# Patient Record
Sex: MALE | Race: WHITE | NOT HISPANIC OR LATINO
[De-identification: names, ages, dates, MRNs, and addresses within clinical notes are randomized per-mention and may not be internally consistent; named-entity substitution may affect disease eponyms.]

---

## 2022-09-09 PROBLEM — Z00.00 ENCOUNTER FOR PREVENTIVE HEALTH EXAMINATION: Status: ACTIVE | Noted: 2022-09-09

## 2022-09-12 ENCOUNTER — APPOINTMENT (OUTPATIENT)
Dept: ORTHOPEDIC SURGERY | Facility: CLINIC | Age: 37
End: 2022-09-12

## 2022-09-12 VITALS — BODY MASS INDEX: 26.82 KG/M2 | WEIGHT: 198 LBS | HEIGHT: 72 IN

## 2022-09-12 DIAGNOSIS — M54.12 RADICULOPATHY, CERVICAL REGION: ICD-10-CM

## 2022-09-12 PROCEDURE — 99204 OFFICE O/P NEW MOD 45 MIN: CPT

## 2022-09-12 PROCEDURE — 72040 X-RAY EXAM NECK SPINE 2-3 VW: CPT

## 2022-09-12 PROCEDURE — 73030 X-RAY EXAM OF SHOULDER: CPT | Mod: RT

## 2022-09-12 PROCEDURE — 99072 ADDL SUPL MATRL&STAF TM PHE: CPT

## 2022-09-12 NOTE — DISCUSSION/SUMMARY
[de-identified] : \par RE:  FIFI DOMINGUEZ \par \par Acct #- [id number] \par \par Attention:  Nurse Reviewer /Medical Director\par \par  \par Based on my patient's condition, I strongly believe that the MRI R shoulder is medically.necessary.  \par The patient has failed oral meds, injections and PT and conservative treatment in combination or by themselves and therefore needs the MRI.  \par The MRI will dictate further treatment t recommendations.\par ice\par restrict activities\par Attention:  Nurse Reviewer /Medical Director\par \par I am writing this letter as a request for comp authorization for PT program.\par Patient has tried analgesics, non-steroid anti-inflammatory agents, \par hot or cold compresses,injections of corticosteroids, etc)  which in combination or by themselves has not worked.\par Based on my patient's condition, I strongly believe that the Hyaluronic aid injections is medically needed.\par  \par Thank you for your time and consideration.   \par  \par \par \par \par \par  \par \par

## 2022-09-12 NOTE — HISTORY OF PRESENT ILLNESS
[Work related] : work related [Sudden] : sudden [10] : 10 [0] : 0 [Sharp] : sharp [Tingling] : tingling [Intermittent] : intermittent [Leisure] : leisure [Work] : work [Nothing helps with pain getting better] : Nothing helps with pain getting better [Extending back] : extending back [Not working due to injury] : Work status: not working due to injury [de-identified] : 36 year RHD old male with pain in the right shoulder and arm. He was moving a 50 pound box and the box was going to fall, he went to move it with the right arm and felt a pop in the right shoulder. He does have some pain that extends to the shoulder blade region and some numbness and tingling into the right upper extremity. No prior history of injury. He has not had prior injury to these regions. Advil with some help, been OOW, he is firearm instructor [] : no [FreeTextEntry3] : 9/9/22 [FreeTextEntry5] : Patient is here today for workers comp appointment of right shoulder. Pt moving 50 pound boxes when one started to fall and he caught it with his right arm and felt a pop in his shoulder. Patient states they have a limited range of motion.\par Pt feels pain towards the back of his shoulder. At first felt numbness/tingling in his fingers [FreeTextEntry7] : travels down arm [de-identified] : lifting arm, certain motions [de-identified] : Urgent Care

## 2022-09-12 NOTE — PHYSICAL EXAM
[Straightening consistent with spasm] : Straightening consistent with spasm [5 ___] : forward flexion 5[unfilled]/5 [5___] : external rotation 5[unfilled]/5 [Right] : right shoulder [There are no fractures, subluxations or dislocations. No significant abnormalities are seen] : There are no fractures, subluxations or dislocations. No significant abnormalities are seen [] : full strength in all planes of motion [TWNoteComboBox7] : active forward flexion 120 degrees [TWNoteComboBox6] : internal rotation L4 [de-identified] : external rotation 60 degrees

## 2022-09-12 NOTE — WORK
[Sprain/Strain] : sprain/strain [Was the competent medical cause of the injury] : was the competent medical cause of the injury [Are consistent with the injury] : are consistent with the injury [Consistent with my objective findings] : consistent with my objective findings [Total] : total [Does not reveal pre-existing condition(s) that may affect treatment/prognosis] : does not reveal pre-existing condition(s) that may affect treatment/prognosis [Cannot return to work because ________] : cannot return to work because [unfilled] [Unknown at this time] : : unknown at this time [Patient] : patient [No Rx restrictions] : No Rx restrictions. [I provided the services listed above] :  I provided the services listed above. [FreeTextEntry1] : guarded

## 2022-09-12 NOTE — REASON FOR VISIT
[FreeTextEntry2] : DOA 9/9/22\par Patient is here today for workers comp appointment of right shoulder and neck issue DOI is 9/9/22

## 2022-09-14 ENCOUNTER — FORM ENCOUNTER (OUTPATIENT)
Age: 37
End: 2022-09-14

## 2022-09-15 ENCOUNTER — APPOINTMENT (OUTPATIENT)
Dept: MRI IMAGING | Facility: CLINIC | Age: 37
End: 2022-09-15

## 2022-09-15 PROCEDURE — 99072 ADDL SUPL MATRL&STAF TM PHE: CPT

## 2022-09-15 PROCEDURE — 73221 MRI JOINT UPR EXTREM W/O DYE: CPT | Mod: RT

## 2022-09-20 ENCOUNTER — TRANSCRIPTION ENCOUNTER (OUTPATIENT)
Age: 37
End: 2022-09-20

## 2022-09-20 ENCOUNTER — APPOINTMENT (OUTPATIENT)
Dept: ORTHOPEDIC SURGERY | Facility: CLINIC | Age: 37
End: 2022-09-20

## 2022-09-20 VITALS — WEIGHT: 198 LBS | BODY MASS INDEX: 26.82 KG/M2 | HEIGHT: 72 IN

## 2022-09-20 PROCEDURE — 99072 ADDL SUPL MATRL&STAF TM PHE: CPT

## 2022-09-20 PROCEDURE — 99214 OFFICE O/P EST MOD 30 MIN: CPT

## 2022-09-20 NOTE — RETURN TO WORK/SCHOOL
[Return Date: _____] : as of [unfilled].  This has been discussed in detail with ~Janay~ and ~he/she~ understands this.

## 2022-09-20 NOTE — WORK
[Total] : total [Does not reveal pre-existing condition(s) that may affect treatment/prognosis] : does not reveal pre-existing condition(s) that may affect treatment/prognosis [Can return to work without limitations on ______] : can return to work without limitations on [unfilled] [Unknown at this time] : : unknown at this time [Patient] : patient [No Rx restrictions] : No Rx restrictions. [I provided the services listed above] :  I provided the services listed above. [FreeTextEntry1] : guarded

## 2022-09-20 NOTE — PHYSICAL EXAM
[Right] : right shoulder [5 ___] : forward flexion 5[unfilled]/5 [5___] : external rotation 5[unfilled]/5 [] : motor and sensory intact distally [TWNoteComboBox7] : active forward flexion 120 degrees [TWNoteComboBox6] : internal rotation L4 [de-identified] : external rotation 60 degrees

## 2022-09-20 NOTE — REVIEW OF SYSTEMS
[Joint Pain] : joint pain [Negative] : Heme/Lymph [Joint Stiffness] : no joint stiffness [Joint Swelling] : joint swelling

## 2022-09-20 NOTE — HISTORY OF PRESENT ILLNESS
[10] : 10 [0] : 0 [Sharp] : sharp [Not working due to injury] : Work status: not working due to injury [de-identified] :  pain in the right shoulder and arm. after  moving a 50 pound box and the box was going to fall, he went to move it with the right arm and felt a pop in the right shoulder. He does have some pain that extends to the shoulder blade region and some numbness and tingling into the right upper extremity. No prior history of injury. He had MRI [Work related] : work related [Sudden] : sudden [Tingling] : tingling [Intermittent] : intermittent [Leisure] : leisure [Work] : work [Nothing helps with pain getting better] : Nothing helps with pain getting better [Extending back] : extending back [] : Post Surgical Visit: no [FreeTextEntry3] : 9/9/22 [FreeTextEntry5] : Patient is here today for workers comp appointment of right shoulder. Pt moving 50 pound boxes when one started to fall and he caught it with his right arm and felt a pop in his shoulder. Patient states they have a limited range of motion.\par Pt feels pain towards the back of his shoulder. At first felt numbness/tingling in his fingers [FreeTextEntry7] : travels down arm [de-identified] : lifting arm, certain motions [de-identified] : Urgent Care

## 2022-09-20 NOTE — DATA REVIEWED
[MRI] : MRI [Right] : of the right [Shoulder] : shoulder [Report was reviewed and noted in the chart] : The report was reviewed and noted in the chart [I reviewed the films/CD and agree] : I reviewed the films/CD and agree [FreeTextEntry1] : labral tear and strain RTC

## 2022-09-20 NOTE — DISCUSSION/SUMMARY
[de-identified] : \par Patient allowed to gently start resuming activities. \par Discussed change to medication prescription and usage. \par Bracing options discussed with patient. \par Activity modification as needed\par Discussed poss future surgery, pt deciding\par

## 2022-10-17 ENCOUNTER — APPOINTMENT (OUTPATIENT)
Dept: ORTHOPEDIC SURGERY | Facility: CLINIC | Age: 37
End: 2022-10-17

## 2022-10-17 VITALS — HEIGHT: 72 IN | WEIGHT: 198 LBS | BODY MASS INDEX: 26.82 KG/M2

## 2022-10-17 PROCEDURE — L3670: CPT

## 2022-10-17 PROCEDURE — 99072 ADDL SUPL MATRL&STAF TM PHE: CPT

## 2022-10-17 PROCEDURE — 99214 OFFICE O/P EST MOD 30 MIN: CPT

## 2022-10-17 NOTE — WORK
[Moderate Partial] : moderate partial [Does not reveal pre-existing condition(s) that may affect treatment/prognosis] : does not reveal pre-existing condition(s) that may affect treatment/prognosis [Can return to work without limitations on ______] : can return to work without limitations on [unfilled] [Unknown at this time] : : unknown at this time [No Rx restrictions] : No Rx restrictions. [Patient] : patient [I provided the services listed above] :  I provided the services listed above. [FreeTextEntry1] : guarded needs surgery

## 2022-10-17 NOTE — PHYSICAL EXAM
[Right] : right shoulder [5 ___] : forward flexion 5[unfilled]/5 [5___] : external rotation 5[unfilled]/5 [] : no atrophy [TWNoteComboBox7] : active forward flexion 155 degrees [TWNoteComboBox6] : internal rotation L4 [de-identified] : external rotation 60 degrees

## 2022-10-17 NOTE — HISTORY OF PRESENT ILLNESS
[Burning] : burning [Sharp] : sharp [Shooting] : shooting [Work related] : work related [Sudden] : sudden [10] : 10 [0] : 0 [Tingling] : tingling [Intermittent] : intermittent [Leisure] : leisure [Work] : work [Nothing helps with pain getting better] : Nothing helps with pain getting better [Extending back] : extending back [Not working due to injury] : Work status: not working due to injury [de-identified] :  pain in the right shoulder and arm. after  moving a 50 pound box and the box was going to fall, he went to move it with the right arm and felt a pop in the right shoulder. He have issues with certain motions, has known labral tear and strain RTC [] : Post Surgical Visit: no [FreeTextEntry1] : right shoulder  [FreeTextEntry3] : 9/9/22 [FreeTextEntry5] : Patient is here today for workers comp appointment of right shoulder. Pt moving 50 pound boxes when one started to fall and he caught it with his right arm and felt a pop in his shoulder. Patient states they have a limited range of motion.\par Pt feels pain towards the back of his shoulder. At first felt numbness/tingling in his fingers [FreeTextEntry7] : travels down arm [de-identified] : lifting arm, certain motions [de-identified] : Urgent Care [de-identified] : physical therapy

## 2022-10-17 NOTE — REASON FOR VISIT
[FreeTextEntry2] : DOA 9/9/22\par Patient is here today for workers comp appointment of right shoulder and neck issue

## 2022-10-17 NOTE — DISCUSSION/SUMMARY
[de-identified] : Patient allowed to gently start resuming activities. \par Discussed change to medication prescription and usage. \par Discussed poss future surgery, pt deciding\par request comp auth for R shoulder labral repair surg discussion questions answered, shoulder abduction pillow sling\par PT may him feel worse and surgery is needed

## 2022-11-01 ENCOUNTER — FORM ENCOUNTER (OUTPATIENT)
Age: 37
End: 2022-11-01

## 2022-11-08 ENCOUNTER — FORM ENCOUNTER (OUTPATIENT)
Age: 37
End: 2022-11-08

## 2022-11-21 ENCOUNTER — FORM ENCOUNTER (OUTPATIENT)
Age: 37
End: 2022-11-21

## 2022-12-06 ENCOUNTER — APPOINTMENT (OUTPATIENT)
Age: 37
End: 2022-12-06

## 2022-12-06 PROCEDURE — 29806 SHO ARTHRS SRG CAPSULORRAPHY: CPT | Mod: AS,RT

## 2022-12-06 PROCEDURE — 29806 SHO ARTHRS SRG CAPSULORRAPHY: CPT | Mod: RT

## 2022-12-06 PROCEDURE — 29826 SHO ARTHRS SRG DECOMPRESSION: CPT | Mod: AS,RT

## 2022-12-06 PROCEDURE — 29824 SHO ARTHRS SRG DSTL CLAVICLC: CPT | Mod: AS,RT

## 2022-12-06 PROCEDURE — 29820 SHO ARTHRS SRG PRTL SYNVCT: CPT | Mod: AS,59,RT

## 2022-12-06 PROCEDURE — 29824 SHO ARTHRS SRG DSTL CLAVICLC: CPT | Mod: 59,RT

## 2022-12-06 PROCEDURE — 29826 SHO ARTHRS SRG DECOMPRESSION: CPT | Mod: RT

## 2022-12-06 PROCEDURE — 29820 SHO ARTHRS SRG PRTL SYNVCT: CPT | Mod: 59,RT

## 2022-12-06 RX ORDER — OXYCODONE AND ACETAMINOPHEN 5; 325 MG/1; MG/1
5-325 TABLET ORAL
Qty: 40 | Refills: 0 | Status: ACTIVE | COMMUNITY
Start: 2022-12-06 | End: 1900-01-01

## 2022-12-15 ENCOUNTER — APPOINTMENT (OUTPATIENT)
Dept: ORTHOPEDIC SURGERY | Facility: CLINIC | Age: 37
End: 2022-12-15

## 2022-12-15 ENCOUNTER — NON-APPOINTMENT (OUTPATIENT)
Age: 37
End: 2022-12-15

## 2022-12-15 PROCEDURE — 99024 POSTOP FOLLOW-UP VISIT: CPT

## 2022-12-15 RX ORDER — HYDROCODONE BITARTRATE AND ACETAMINOPHEN 7.5; 325 MG/1; MG/1
7.5-325 TABLET ORAL
Qty: 30 | Refills: 0 | Status: ACTIVE | COMMUNITY
Start: 2022-12-15 | End: 1900-01-01

## 2022-12-15 NOTE — WORK
[Total] : total [Does not reveal pre-existing condition(s) that may affect treatment/prognosis] : does not reveal pre-existing condition(s) that may affect treatment/prognosis [Cannot return to work because ________] : cannot return to work because [unfilled] [Patient] : patient [No Rx restrictions] : No Rx restrictions. [I provided the services listed above] :  I provided the services listed above. [FreeTextEntry1] : good

## 2022-12-15 NOTE — HISTORY OF PRESENT ILLNESS
[] : Post Surgical Visit: yes [de-identified] : Patient s/p right shoulder arthroscopic labral repair, SAD, DCE, and partial synovectomy on 12/6/22. Patient presents in the sling, no fever/chills, pain is manageable.  [de-identified] : 12/06/2022 [de-identified] : right shoulder arthroscopy

## 2022-12-15 NOTE — DISCUSSION/SUMMARY
[de-identified] : Surgical findings reviewed with the patient.\par Activities and restrictions discussed.\par Start PT and dc sling the week on 12/26/22.\par HEP to perform at that time reviewed.\par no ER\par return in 5 weeks.\par \par 12/15/2022 \par \par  RE:  FIFI VALLESPHILLYTITO \par \par Acct #- [id number] \par \par \par Attention:  Nurse Reviewer /Medical Director\par \par I am writing this letter as a medical necessity for PT program.\par Patient has tried analgesics, non-steroid anti-inflammatory agents, \par hot or cold compresses,injections of corticosteroids, etc)  which in combination or by themselves has not worked.\par Based on my patient's condition, I strongly believe that the PT is medically needed.\par  \par Thank you for your time and consideration.   \par

## 2023-01-26 ENCOUNTER — APPOINTMENT (OUTPATIENT)
Dept: ORTHOPEDIC SURGERY | Facility: CLINIC | Age: 38
End: 2023-01-26
Payer: OTHER MISCELLANEOUS

## 2023-01-26 VITALS — BODY MASS INDEX: 26.82 KG/M2 | WEIGHT: 198 LBS | HEIGHT: 72 IN

## 2023-01-26 DIAGNOSIS — Z78.9 OTHER SPECIFIED HEALTH STATUS: ICD-10-CM

## 2023-01-26 PROCEDURE — 99024 POSTOP FOLLOW-UP VISIT: CPT

## 2023-01-26 NOTE — HISTORY OF PRESENT ILLNESS
[10] : 10 [0] : 0 [de-identified] : Patient s/p right shoulder arthroscopic labral repair, SAD, DCE, and partial synovectomy on 12/6/22. He is in PT and not workig [] : Post Surgical Visit: yes [FreeTextEntry1] : right shoulder [de-identified] : 12/06/2022 [de-identified] : right shoulder arthroscopy

## 2023-01-26 NOTE — PHYSICAL EXAM
[Right] : right shoulder [] : motor and sensory intact distally [FreeTextEntry9] : na [de-identified] : na

## 2023-02-14 ENCOUNTER — FORM ENCOUNTER (OUTPATIENT)
Age: 38
End: 2023-02-14

## 2023-03-06 ENCOUNTER — APPOINTMENT (OUTPATIENT)
Dept: ORTHOPEDIC SURGERY | Facility: CLINIC | Age: 38
End: 2023-03-06
Payer: OTHER MISCELLANEOUS

## 2023-03-06 ENCOUNTER — NON-APPOINTMENT (OUTPATIENT)
Age: 38
End: 2023-03-06

## 2023-03-06 VITALS — WEIGHT: 198 LBS | BODY MASS INDEX: 26.82 KG/M2 | HEIGHT: 72 IN

## 2023-03-06 DIAGNOSIS — S43.401A UNSPECIFIED SPRAIN OF RIGHT SHOULDER JOINT, INITIAL ENCOUNTER: ICD-10-CM

## 2023-03-06 PROCEDURE — 99024 POSTOP FOLLOW-UP VISIT: CPT

## 2023-03-06 NOTE — PHYSICAL EXAM
[Right] : right shoulder [] : no scapular winging [FreeTextEntry9] : na [de-identified] : na [TWNoteComboBox7] : active forward flexion 180 degrees [TWNoteComboBox6] : internal rotation 10 degrees

## 2023-03-06 NOTE — HISTORY OF PRESENT ILLNESS
[Localized] : localized [Sharp] : sharp [10] : 10 [0] : 0 [] : Post Surgical Visit: yes [de-identified] : Patient s/p right shoulder arthroscopic labral repair, SAD, DCE, and partial synovectomy on 12/6/22. He is in PT and not working [FreeTextEntry1] : right shoulder [de-identified] : pt  [de-identified] : 12/06/2022 [de-identified] : right shoulder arthroscopy

## 2023-03-09 ENCOUNTER — APPOINTMENT (OUTPATIENT)
Dept: ORTHOPEDIC SURGERY | Facility: CLINIC | Age: 38
End: 2023-03-09

## 2023-04-12 ENCOUNTER — NON-APPOINTMENT (OUTPATIENT)
Age: 38
End: 2023-04-12

## 2023-04-12 ENCOUNTER — APPOINTMENT (OUTPATIENT)
Dept: ORTHOPEDIC SURGERY | Facility: CLINIC | Age: 38
End: 2023-04-12
Payer: OTHER MISCELLANEOUS

## 2023-04-12 VITALS — WEIGHT: 215 LBS | HEIGHT: 72 IN | BODY MASS INDEX: 29.12 KG/M2

## 2023-04-12 PROCEDURE — 99214 OFFICE O/P EST MOD 30 MIN: CPT

## 2023-04-12 NOTE — DISCUSSION/SUMMARY
[de-identified] : Let this serve as a formal request for authorization pt, MG-2 to continue\par modify activities\par try OTC meds\par ice as needed\par

## 2023-04-12 NOTE — HISTORY OF PRESENT ILLNESS
[Work related] : work related [8] : 8 [0] : 0 [Not working due to injury] : Work status: not working due to injury [] : yes [de-identified] : Patient s/p right shoulder arthroscopic labral repair, SAD, DCE, and partial synovectomy on 12/6/22. He is in PT and not working, some restriction with motion [FreeTextEntry1] : right shoulder  [FreeTextEntry3] : 09/09/2022 [de-identified] : physical therapy

## 2023-04-12 NOTE — PHYSICAL EXAM
[Right] : right shoulder [] : no scapular winging [FreeTextEntry9] : na [de-identified] : na [TWNoteComboBox7] : active forward flexion 180 degrees [TWNoteComboBox6] : internal rotation 15 degrees

## 2023-05-01 ENCOUNTER — FORM ENCOUNTER (OUTPATIENT)
Age: 38
End: 2023-05-01

## 2023-06-07 ENCOUNTER — NON-APPOINTMENT (OUTPATIENT)
Age: 38
End: 2023-06-07

## 2023-06-07 ENCOUNTER — APPOINTMENT (OUTPATIENT)
Dept: ORTHOPEDIC SURGERY | Facility: CLINIC | Age: 38
End: 2023-06-07
Payer: OTHER MISCELLANEOUS

## 2023-06-07 VITALS — BODY MASS INDEX: 29.12 KG/M2 | WEIGHT: 215 LBS | HEIGHT: 72 IN

## 2023-06-07 PROCEDURE — 99214 OFFICE O/P EST MOD 30 MIN: CPT

## 2023-06-07 NOTE — WORK
[Mild Partial] : mild partial [Does not reveal pre-existing condition(s) that may affect treatment/prognosis] : does not reveal pre-existing condition(s) that may affect treatment/prognosis [Can return to work without limitations on ______] : can return to work without limitations on [unfilled] [Patient] : patient [No Rx restrictions] : No Rx restrictions. [I provided the services listed above] :  I provided the services listed above. [FreeTextEntry1] : good

## 2023-06-07 NOTE — HISTORY OF PRESENT ILLNESS
[10] : 10 [0] : 0 [Not working due to injury] : Work status: not working due to injury [] : yes [de-identified] : Patient s/p right shoulder arthroscopic labral repair, SAD, DCE, and partial synovectomy on 12/6/22. He is working in lighter capacity and certain exercises make him feel worse [FreeTextEntry1] : right shoulder

## 2023-06-07 NOTE — PHYSICAL EXAM
[Right] : right shoulder [] : motor and sensory intact distally [FreeTextEntry8] : mild [FreeTextEntry9] : na [de-identified] : na [TWNoteComboBox7] : active forward flexion 180 degrees [TWNoteComboBox6] : internal rotation 15 degrees

## 2023-06-26 ENCOUNTER — APPOINTMENT (OUTPATIENT)
Dept: MRI IMAGING | Facility: CLINIC | Age: 38
End: 2023-06-26

## 2023-06-28 ENCOUNTER — APPOINTMENT (OUTPATIENT)
Dept: MRI IMAGING | Facility: CLINIC | Age: 38
End: 2023-06-28

## 2023-07-11 ENCOUNTER — FORM ENCOUNTER (OUTPATIENT)
Age: 38
End: 2023-07-11

## 2023-07-12 ENCOUNTER — APPOINTMENT (OUTPATIENT)
Dept: MRI IMAGING | Facility: CLINIC | Age: 38
End: 2023-07-12
Payer: OTHER MISCELLANEOUS

## 2023-07-12 PROCEDURE — 23350 INJECTION FOR SHOULDER X-RAY: CPT | Mod: RT

## 2023-07-12 PROCEDURE — 73223 MRI JOINT UPR EXTR W/O&W/DYE: CPT | Mod: RT

## 2023-07-26 ENCOUNTER — APPOINTMENT (OUTPATIENT)
Dept: ORTHOPEDIC SURGERY | Facility: CLINIC | Age: 38
End: 2023-07-26
Payer: OTHER MISCELLANEOUS

## 2023-07-26 ENCOUNTER — NON-APPOINTMENT (OUTPATIENT)
Age: 38
End: 2023-07-26

## 2023-07-26 VITALS — WEIGHT: 215 LBS | BODY MASS INDEX: 29.12 KG/M2 | HEIGHT: 72 IN

## 2023-07-26 PROCEDURE — 99214 OFFICE O/P EST MOD 30 MIN: CPT

## 2023-07-26 NOTE — PHYSICAL EXAM
[Right] : right shoulder [] : motor and sensory intact distally [FreeTextEntry8] : mild [FreeTextEntry9] : na [de-identified] : na [TWNoteComboBox7] : active forward flexion 180 degrees [TWNoteComboBox6] : internal rotation 15 degrees

## 2023-07-26 NOTE — DATA REVIEWED
[MRI] : MRI [Right] : of the right [Shoulder] : shoulder [Report was reviewed and noted in the chart] : The report was reviewed and noted in the chart [I reviewed the films/CD and agree] : I reviewed the films/CD and agree [FreeTextEntry1] : MRA- no new tears, tendoniitis and inflammation present

## 2023-07-26 NOTE — HISTORY OF PRESENT ILLNESS
[10] : 10 [0] : 0 [de-identified] : Patient s/p right shoulder arthroscopic labral repair, SAD, DCE, and partial synovectomy on 12/6/22. He is working in lighter capacity and certain exercises make him feel worse, he had mRA [FreeTextEntry1] : right shoulder

## 2023-09-06 ENCOUNTER — APPOINTMENT (OUTPATIENT)
Dept: ORTHOPEDIC SURGERY | Facility: CLINIC | Age: 38
End: 2023-09-06

## 2023-10-25 ENCOUNTER — NON-APPOINTMENT (OUTPATIENT)
Age: 38
End: 2023-10-25

## 2023-10-25 ENCOUNTER — APPOINTMENT (OUTPATIENT)
Dept: ORTHOPEDIC SURGERY | Facility: CLINIC | Age: 38
End: 2023-10-25
Payer: OTHER MISCELLANEOUS

## 2023-10-25 VITALS — WEIGHT: 215 LBS | BODY MASS INDEX: 29.12 KG/M2 | HEIGHT: 72 IN

## 2023-10-25 DIAGNOSIS — S46.011D STRAIN OF MUSCLE(S) AND TENDON(S) OF THE ROTATOR CUFF OF RIGHT SHOULDER, SUBSEQUENT ENCOUNTER: ICD-10-CM

## 2023-10-25 DIAGNOSIS — S43.431D SUPERIOR GLENOID LABRUM LESION OF RIGHT SHOULDER, SUBSEQUENT ENCOUNTER: ICD-10-CM

## 2023-10-25 DIAGNOSIS — Z78.9 OTHER SPECIFIED HEALTH STATUS: ICD-10-CM

## 2023-10-25 PROCEDURE — 99455 WORK RELATED DISABILITY EXAM: CPT
